# Patient Record
Sex: MALE | Race: WHITE | NOT HISPANIC OR LATINO | Employment: FULL TIME | URBAN - METROPOLITAN AREA
[De-identification: names, ages, dates, MRNs, and addresses within clinical notes are randomized per-mention and may not be internally consistent; named-entity substitution may affect disease eponyms.]

---

## 2023-08-30 PROBLEM — M25.9 MULTIPLE JOINT COMPLAINTS: Status: ACTIVE | Noted: 2023-08-30

## 2023-08-30 PROBLEM — R10.9 ABDOMINAL PAIN: Status: ACTIVE | Noted: 2023-08-30

## 2023-08-30 PROBLEM — M48.061 LUMBAR SPINAL STENOSIS: Status: ACTIVE | Noted: 2023-08-30

## 2023-08-30 PROBLEM — M25.373 INSTABILITY OF ANKLE: Status: ACTIVE | Noted: 2023-08-30

## 2023-08-30 PROBLEM — S46.812A STRAIN OF OTHER MUSCLES, FASCIA AND TENDONS AT SHOULDER AND UPPER ARM LEVEL, LEFT ARM, INITIAL ENCOUNTER: Status: ACTIVE | Noted: 2023-08-30

## 2023-08-30 PROBLEM — H43.819 PVD (POSTERIOR VITREOUS DETACHMENT): Status: ACTIVE | Noted: 2023-08-30

## 2023-08-30 PROBLEM — G56.00 CARPAL TUNNEL SYNDROME: Status: ACTIVE | Noted: 2023-08-30

## 2023-08-30 PROBLEM — S46.012A ROTATOR CUFF STRAIN, LEFT, INITIAL ENCOUNTER: Status: ACTIVE | Noted: 2023-08-30

## 2023-08-30 PROBLEM — M13.811 OTHER SPECIFIED ARTHRITIS, RIGHT SHOULDER: Status: ACTIVE | Noted: 2023-08-30

## 2023-08-30 PROBLEM — R79.89 ABNORMAL LIVER FUNCTION TEST: Status: ACTIVE | Noted: 2023-08-30

## 2023-08-30 PROBLEM — S43.92XA SPRAIN OF LEFT SHOULDER GIRDLE: Status: ACTIVE | Noted: 2023-08-30

## 2023-08-30 PROBLEM — F41.9 ANXIETY: Status: ACTIVE | Noted: 2023-08-30

## 2023-08-30 PROBLEM — S43.432A BANKART LESION OF LEFT SHOULDER: Status: ACTIVE | Noted: 2023-08-30

## 2023-08-30 PROBLEM — K21.9 ESOPHAGEAL REFLUX: Status: ACTIVE | Noted: 2023-08-30

## 2023-08-30 PROBLEM — S43.82XA: Status: ACTIVE | Noted: 2023-08-30

## 2023-08-30 PROBLEM — M19.112 POST-TRAUMATIC OSTEOARTHRITIS, LEFT SHOULDER: Status: ACTIVE | Noted: 2023-08-30

## 2023-08-30 PROBLEM — Z97.3 WEARS GLASSES: Status: ACTIVE | Noted: 2023-08-30

## 2023-08-30 PROBLEM — R20.0 FACIAL NUMBNESS: Status: ACTIVE | Noted: 2023-08-30

## 2023-08-30 PROBLEM — H25.13 AGE-RELATED NUCLEAR CATARACT OF BOTH EYES: Status: ACTIVE | Noted: 2023-08-30

## 2023-08-30 PROBLEM — E78.5 HYPERLIPIDEMIA: Status: ACTIVE | Noted: 2023-08-30

## 2023-08-30 PROBLEM — M54.50 LOW BACK PAIN: Status: ACTIVE | Noted: 2023-08-30

## 2023-08-30 PROBLEM — S43.492A BANKART LESION OF LEFT SHOULDER: Status: ACTIVE | Noted: 2023-08-30

## 2023-08-30 PROBLEM — L71.9 ROSACEA: Status: ACTIVE | Noted: 2023-08-30

## 2023-08-30 PROBLEM — M67.00 CONTRACTURE, ACHILLES TENDON: Status: ACTIVE | Noted: 2023-08-30

## 2023-08-30 PROBLEM — N20.0 NEPHROLITHIASIS: Status: ACTIVE | Noted: 2023-08-30

## 2023-08-30 PROBLEM — M54.9 DORSODYNIA: Status: ACTIVE | Noted: 2023-08-30

## 2023-08-30 PROBLEM — K63.5 BENIGN COLONIC POLYP: Status: ACTIVE | Noted: 2023-08-30

## 2023-08-30 PROBLEM — M48.02 CERVICAL STENOSIS OF SPINE: Status: ACTIVE | Noted: 2023-08-30

## 2023-08-30 PROBLEM — M43.10 SPONDYLOLISTHESIS, ACQUIRED: Status: ACTIVE | Noted: 2023-08-30

## 2023-08-30 PROBLEM — E55.9 VITAMIN D DEFICIENCY: Status: ACTIVE | Noted: 2023-08-30

## 2023-08-30 PROBLEM — M77.40 METATARSALGIA: Status: ACTIVE | Noted: 2023-08-30

## 2023-08-30 PROBLEM — R10.2 PELVIC PAIN: Status: ACTIVE | Noted: 2023-08-30

## 2023-08-30 RX ORDER — IBUPROFEN 200 MG
TABLET ORAL
COMMUNITY

## 2023-08-30 RX ORDER — POLYETHYLENE GLYCOL 3350, SODIUM SULFATE ANHYDROUS, SODIUM BICARBONATE, SODIUM CHLORIDE, POTASSIUM CHLORIDE 236; 22.74; 6.74; 5.86; 2.97 G/4L; G/4L; G/4L; G/4L; G/4L
POWDER, FOR SOLUTION ORAL
COMMUNITY
Start: 2021-11-13

## 2023-08-30 RX ORDER — IBUPROFEN 600 MG/1
600 TABLET ORAL EVERY 6 HOURS PRN
COMMUNITY
Start: 2023-06-28

## 2023-11-16 ENCOUNTER — OFFICE VISIT (OUTPATIENT)
Dept: ORTHOPEDIC SURGERY | Facility: HOSPITAL | Age: 63
End: 2023-11-16
Payer: COMMERCIAL

## 2023-11-16 DIAGNOSIS — S46.211A TRAUMATIC PARTIAL TEAR OF RIGHT BICEPS TENDON, INITIAL ENCOUNTER: Primary | ICD-10-CM

## 2023-11-16 DIAGNOSIS — S46.011A TRAUMATIC TEAR OF RIGHT ROTATOR CUFF, INITIAL ENCOUNTER: ICD-10-CM

## 2023-11-16 PROCEDURE — 99213 OFFICE O/P EST LOW 20 MIN: CPT | Performed by: ORTHOPAEDIC SURGERY

## 2023-11-16 ASSESSMENT — ENCOUNTER SYMPTOMS
FATIGUE: 0
CHILLS: 0
WHEEZING: 0
FEVER: 0
ARTHRALGIAS: 1
SHORTNESS OF BREATH: 0

## 2023-11-16 ASSESSMENT — PAIN SCALES - GENERAL: PAINLEVEL_OUTOF10: 6

## 2023-11-16 ASSESSMENT — PAIN - FUNCTIONAL ASSESSMENT: PAIN_FUNCTIONAL_ASSESSMENT: 0-10

## 2023-11-16 NOTE — PROGRESS NOTES
Reason for Appointment  Chief Complaint   Patient presents with    Right Shoulder - Pain, Follow-up     [facility]       History of Present Illness  Patient is a 62 y.o. male here today for follow-up evaluation of right shoulder pain. He is at baseline in terms of pain. He did have an episode of 3-5 days of increased pain with arm rotation and deep anterior pain. This has since calmed down nicely. As long as he does not do significant lifting, he can do activities of daily living with minimal pain. No other updates to [facility], allergies, or medications.     Past Medical History:   Diagnosis Date    Elevated prostate specific antigen (PSA)     Elevated prostate specific antigen (PSA)    Other conditions influencing health status 12/11/2013    Meatal stenosis    Personal history of other diseases of the respiratory system 01/17/2014    History of acute bronchitis    Spondylosis without myelopathy or radiculopathy, lumbar region 12/11/2013    Lumbar spondylosis    Unspecified asthma, uncomplicated 01/17/2014    Asthmatic bronchitis    Unspecified osteoarthritis, unspecified site 12/13/2013    Osteoarthritis       Past Surgical History:   Procedure Laterality Date    CARPAL TUNNEL RELEASE  03/15/2018    Neuroplasty Decompression Median Nerve At Carpal Tunnel    COLONOSCOPY  07/01/2013    Complete Colonoscopy    KNEE ARTHROSCOPY W/ DEBRIDEMENT  01/17/2014    Knee Arthroscopy (Therapeutic)    ROTATOR CUFF REPAIR  01/17/2014    Rotator Cuff Repair    US GUIDED PERCUTANEOUS PLACEMENT  12/7/2022    [facility]       Medication Documentation Review Audit    **Prior to Admission medications have not yet been reviewed**         Allergies   Allergen Reactions    Amoxicillin-Pot Clavulanate Nausea Only       Review of Systems   Constitutional:  Negative for chills, fatigue and fever.   Respiratory:  Negative for shortness of breath and wheezing.    Cardiovascular:  Negative for chest pain and leg  swelling.   Musculoskeletal:  Positive for arthralgias.   All other systems reviewed and are negative.      Exam   On exam of the right arm, there is 120 degrees elevation, no crepitation, no joint effusion, good deltoid function, good pulses, good sensation     Assessment   Encounter Diagnoses   Name Primary?    Traumatic partial tear of right biceps tendon, initial encounter Yes    Traumatic tear of right rotator cuff, initial encounter        Plan   We will follow-up in 4 months with right shoulder X-rays to gauge the deterioration of the arthritis. He will call with any severe increased pain. Follow-up in 4 months.     I, Concepcion Goddard, attest that this documentation has been prepared under the direction and in the presence of Lm Briseno MD. By signing below, I, Lm Briseno MD, personally performed the services described in this documentation. All medical record entries made by the scribe were at my direction and in my presence. I have reviewed the chart and agree that the record reflects my personal performance and is accurate and complete. 11/16/23

## 2024-02-01 ENCOUNTER — OFFICE VISIT (OUTPATIENT)
Dept: ORTHOPEDIC SURGERY | Facility: HOSPITAL | Age: 64
End: 2024-02-01
Payer: COMMERCIAL

## 2024-02-01 ENCOUNTER — HOSPITAL ENCOUNTER (OUTPATIENT)
Dept: RADIOLOGY | Facility: HOSPITAL | Age: 64
Discharge: HOME | End: 2024-02-01
Payer: COMMERCIAL

## 2024-02-01 DIAGNOSIS — S46.211A TRAUMATIC PARTIAL TEAR OF RIGHT BICEPS TENDON, INITIAL ENCOUNTER: ICD-10-CM

## 2024-02-01 DIAGNOSIS — S46.011A STRAIN OF TENDON OF RIGHT ROTATOR CUFF, INITIAL ENCOUNTER: ICD-10-CM

## 2024-02-01 PROCEDURE — 99213 OFFICE O/P EST LOW 20 MIN: CPT | Performed by: ORTHOPAEDIC SURGERY

## 2024-02-01 PROCEDURE — 73030 X-RAY EXAM OF SHOULDER: CPT | Mod: RT

## 2024-02-01 ASSESSMENT — PAIN SCALES - GENERAL: PAINLEVEL_OUTOF10: 5 - MODERATE PAIN

## 2024-02-01 ASSESSMENT — ENCOUNTER SYMPTOMS
TROUBLE SWALLOWING: 0
SHORTNESS OF BREATH: 0
FATIGUE: 0
FEVER: 0
CHILLS: 0
WHEEZING: 0

## 2024-02-01 ASSESSMENT — PAIN - FUNCTIONAL ASSESSMENT: PAIN_FUNCTIONAL_ASSESSMENT: 0-10

## 2024-02-01 NOTE — PROGRESS NOTES
Reason for Appointment  Baseline R shoulder pain    History of Present Illness  Patient is a 63 y.o. male here today for a [facility] case follow-up evaluation of continued baseline right shoulder pain.  X-rays taken today are reviewed and do show significant degenerative change of the glenohumeral joint with intact rotator cuff anchors and a slightly high riding humeral head.  He continues to modify activity in order to avoid severely aggravating the shoulder.  He has difficulties doing any overhead activity and lifting.  No recent injuries or falls.  No other changes in his past medical history, allergies, or medications.      Past Medical History:   Diagnosis Date    Elevated prostate specific antigen (PSA)     Elevated prostate specific antigen (PSA)    Other conditions influencing health status 12/11/2013    Meatal stenosis    Personal history of other diseases of the respiratory system 01/17/2014    History of acute bronchitis    Spondylosis without myelopathy or radiculopathy, lumbar region 12/11/2013    Lumbar spondylosis    Unspecified asthma, uncomplicated 01/17/2014    Asthmatic bronchitis    Unspecified osteoarthritis, unspecified site 12/13/2013    Osteoarthritis       Past Surgical History:   Procedure Laterality Date    CARPAL TUNNEL RELEASE  03/15/2018    Neuroplasty Decompression Median Nerve At Carpal Tunnel    COLONOSCOPY  07/01/2013    Complete Colonoscopy    KNEE ARTHROSCOPY W/ DEBRIDEMENT  01/17/2014    Knee Arthroscopy (Therapeutic)    ROTATOR CUFF REPAIR  01/17/2014    Rotator Cuff Repair    US GUIDED PERCUTANEOUS PLACEMENT  12/7/2022    US GUIDED PERCUTANEOUS PLACEMENT [facility] CLINICAL LEGACY       Medication Documentation Review Audit       Reviewed by Ashtyn Alejandre MA (Medical Assistant) on 02/01/24 at 0753      Medication Order Taking? Sig Documenting Provider Last Dose Status   ibuprofen (AdviL) 200 mg tablet [id number] Yes  Historical Provider, MD Taking Active   ibuprofen 600 mg tablet  [id number] Yes Take 1 tablet (600 mg) by mouth every 6 hours if needed (pain). Historical Provider, MD Taking Active   loratadine (CLARITIN ORAL) [id number] Yes Take by mouth. Historical Provider, MD Taking Active   polyethylene glycol-electrolytes (polyethylene glycol) 420 gram solution [id number] Yes Drink two liters starting at 5 PM and additional two liters starting at midnight as directed. Historical Provider, MD Taking Active                    Allergies   Allergen Reactions    Amoxicillin-Pot Clavulanate Nausea Only       Review of Systems   Constitutional:  Negative for chills, fatigue and fever.   HENT:  Negative for postnasal drip and trouble swallowing.    Respiratory:  Negative for shortness of breath and wheezing.    Cardiovascular:  Negative for chest pain and leg swelling.   Skin:  Negative for pallor and rash.     Exam   On exam the right shoulder shows 120 degrees of active forward flexion.  He has mild weakness with resisted external rotation but fairly good cuff strength.  Deltoid is functional.  Some crepitation movement of the shoulder.  Good pulses and sensation in the upper extremity.    Assessment   Encounter Diagnoses   Name Primary?    Traumatic partial tear of right biceps tendon, initial encounter     Strain of tendon of right rotator cuff, initial encounter        Plan   He continues to function with baseline pain in the right shoulder as long as he avoids significantly aggravating activity especially overhead.  We again have discussed the possible reverse shoulder replacement in the future if his symptoms pain increase.  He can follow-up with us in 4 months months he has any new issues.    Written by Lorena Gonzalez PA-C

## 2024-02-15 ENCOUNTER — APPOINTMENT (OUTPATIENT)
Dept: ORTHOPEDIC SURGERY | Facility: HOSPITAL | Age: 64
End: 2024-02-15
Payer: COMMERCIAL

## 2024-06-06 ENCOUNTER — OFFICE VISIT (OUTPATIENT)
Dept: ORTHOPEDIC SURGERY | Facility: HOSPITAL | Age: 64
End: 2024-06-06
Payer: COMMERCIAL

## 2024-06-06 DIAGNOSIS — S46.011A TRAUMATIC TEAR OF RIGHT ROTATOR CUFF, INITIAL ENCOUNTER: Primary | ICD-10-CM

## 2024-06-06 PROCEDURE — 99213 OFFICE O/P EST LOW 20 MIN: CPT | Performed by: ORTHOPAEDIC SURGERY

## 2024-06-06 ASSESSMENT — ENCOUNTER SYMPTOMS
WHEEZING: 0
FEVER: 0
RHINORRHEA: 0
TROUBLE SWALLOWING: 0
CHILLS: 0
ARTHRALGIAS: 1
FATIGUE: 0
SHORTNESS OF BREATH: 0

## 2024-06-06 ASSESSMENT — PAIN - FUNCTIONAL ASSESSMENT: PAIN_FUNCTIONAL_ASSESSMENT: 0-10

## 2024-06-06 ASSESSMENT — PAIN SCALES - GENERAL: PAINLEVEL_OUTOF10: 4

## 2024-06-06 NOTE — PROGRESS NOTES
Reason for Appointment  Chief Complaint   Patient presents with    Right Shoulder - Follow-up     History of Present Illness  Patient is a 63 y.o. male here today for a [facility] case follow-up evaluation of baseline right shoulder pain.  He continues to have pain and weakness with any overhead motion.  He is very careful with what he does and avoids aggravating the shoulders.  We again have discussed a reverse shoulder replacement in the future but he would like to avoid this for as long as possible.  No other is in his past medical history, allergies, or medications.    Past Medical History:   Diagnosis Date    Elevated prostate specific antigen (PSA)     Elevated prostate specific antigen (PSA)    Other conditions influencing health status 12/11/2013    Meatal stenosis    Personal history of other diseases of the respiratory system 01/17/2014    History of acute bronchitis    Spondylosis without myelopathy or radiculopathy, lumbar region 12/11/2013    Lumbar spondylosis    Unspecified asthma, uncomplicated ([facility]) 01/17/2014    Asthmatic bronchitis    Unspecified osteoarthritis, unspecified site 12/13/2013    Osteoarthritis       Past Surgical History:   Procedure Laterality Date    CARPAL TUNNEL RELEASE  03/15/2018    Neuroplasty Decompression Median Nerve At Carpal Tunnel    COLONOSCOPY  07/01/2013    Complete Colonoscopy    KNEE ARTHROSCOPY W/ DEBRIDEMENT  01/17/2014    Knee Arthroscopy (Therapeutic)    ROTATOR CUFF REPAIR  01/17/2014    Rotator Cuff Repair    US GUIDED PERCUTANEOUS PLACEMENT  12/7/2022    US GUIDED PERCUTANEOUS PLACEMENT [facility] CLINICAL LEGACY       Medication Documentation Review Audit       Reviewed by Ashtyn Alejandre MA (Medical Assistant) on 06/06/24 at 0756      Medication Order Taking? Sig Documenting Provider Last Dose Status   ibuprofen (AdviL) 200 mg tablet [id number] Yes  Historical Provider, MD Taking Active   ibuprofen 600 mg tablet [id number] Yes Take 1 tablet (600 mg) by mouth every 6  hours if needed (pain). Historical Provider, MD Taking Active   loratadine (CLARITIN ORAL) [id number] Yes Take by mouth. Historical Provider, MD Taking Active   polyethylene glycol-electrolytes (polyethylene glycol) 420 gram solution [id number] Yes Drink two liters starting at 5 PM and additional two liters starting at midnight as directed. Historical Provider, MD Taking Active                    Allergies   Allergen Reactions    Amoxicillin-Pot Clavulanate Nausea Only       Review of Systems   Constitutional:  Negative for chills, fatigue and fever.   HENT:  Negative for rhinorrhea and trouble swallowing.    Respiratory:  Negative for shortness of breath and wheezing.    Cardiovascular:  Negative for chest pain and leg swelling.   Musculoskeletal:  Positive for arthralgias.   Skin:  Negative for pallor and rash.     Exam   On exam the right shoulder shows baseline motion up to about 120 degrees with some crepitation.  He has mild weakness with resisted external rotation.  Deltoid is functional.  Positive impingement signs on the right.  Good pulses and sensation in the upper extremity.    Assessment   Right rotator cuff tear    Plan   Again, he understands long-term a possible reverse shoulder replacement but he would like to again avoid this for as long as possible.  He  uses proper lifting techniques and avoiding aggravating activity.  He can follow-up with us in 4 months unless he has any new issues.

## 2024-10-10 ENCOUNTER — APPOINTMENT (OUTPATIENT)
Dept: ORTHOPEDIC SURGERY | Facility: HOSPITAL | Age: 64
End: 2024-10-10
Payer: COMMERCIAL

## 2024-10-17 ENCOUNTER — OFFICE VISIT (OUTPATIENT)
Dept: ORTHOPEDIC SURGERY | Facility: HOSPITAL | Age: 64
End: 2024-10-17
Payer: COMMERCIAL

## 2024-10-17 DIAGNOSIS — S46.011A TRAUMATIC TEAR OF RIGHT ROTATOR CUFF, INITIAL ENCOUNTER: Primary | ICD-10-CM

## 2024-10-17 PROCEDURE — 99213 OFFICE O/P EST LOW 20 MIN: CPT | Performed by: ORTHOPAEDIC SURGERY

## 2024-10-17 ASSESSMENT — PAIN - FUNCTIONAL ASSESSMENT: PAIN_FUNCTIONAL_ASSESSMENT: 0-10

## 2024-10-17 ASSESSMENT — ENCOUNTER SYMPTOMS
CHILLS: 0
SHORTNESS OF BREATH: 0
WHEEZING: 0
FEVER: 0
FATIGUE: 0

## 2024-10-17 ASSESSMENT — PAIN SCALES - GENERAL: PAINLEVEL_OUTOF10: 6

## 2024-10-17 NOTE — PROGRESS NOTES
Reason for Appointment  Chief Complaint   Patient presents with    Right Shoulder - Follow-up     History of Present Illness  Patient is a 63 y.o. 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     Past Medical History:   Diagnosis Date    Elevated prostate specific antigen (PSA)     Elevated prostate specific antigen (PSA)    Other conditions influencing health status 12/11/2013    Meatal stenosis    Personal history of other diseases of the respiratory system 01/17/2014    History of acute bronchitis    Spondylosis without myelopathy or radiculopathy, lumbar region 12/11/2013    Lumbar spondylosis    Unspecified asthma, uncomplicated ([facility]) 01/17/2014    Asthmatic bronchitis    Unspecified osteoarthritis, unspecified site 12/13/2013    Osteoarthritis       Past Surgical History:   Procedure Laterality Date    CARPAL TUNNEL RELEASE  03/15/2018    Neuroplasty Decompression Median Nerve At Carpal Tunnel    COLONOSCOPY  07/01/2013    Complete Colonoscopy    KNEE ARTHROSCOPY W/ DEBRIDEMENT  01/17/2014    Knee Arthroscopy (Therapeutic)    ROTATOR CUFF REPAIR  01/17/2014    Rotator Cuff Repair    US GUIDED PERCUTANEOUS PLACEMENT  12/7/2022    US GUIDED PERCUTANEOUS PLACEMENT [facility]       Medication Documentation Review Audit       Reviewed by Lm Briseno MD (Physician)  on 10/17/24 at 0818      Medication Order Taking? Sig Documenting Provider Last Dose Status   ibuprofen (AdviL) 200 mg tablet [id number] Yes  Historical Provider, MD Taking Active   ibuprofen 600 mg tablet [id number] Yes Take 1 tablet (600 mg) by mouth every 6 hours if needed (pain). Historical Provider, MD Taking Active   loratadine (CLARITIN ORAL) [id number] Yes Take by mouth. Historical Provider, MD Taking Active   polyethylene glycol-electrolytes (polyethylene glycol) 420 gram solution [id number] Yes Drink two liters starting at 5 PM and additional two liters starting at midnight as directed. Historical Provider, MD Taking Active                    Allergies   Allergen Reactions    Amoxicillin-Pot Clavulanate Nausea Only       Review of Systems   Constitutional:  Negative for chills, fatigue and fever.   Respiratory:  Negative for shortness of breath and wheezing.    Cardiovascular:  Negative for chest pain and leg swelling.       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

## 2025-02-13 ENCOUNTER — APPOINTMENT (OUTPATIENT)
Dept: ORTHOPEDIC SURGERY | Facility: HOSPITAL | Age: 65
End: 2025-02-13
Payer: COMMERCIAL

## 2025-02-13 DIAGNOSIS — S46.011A TRAUMATIC TEAR OF RIGHT ROTATOR CUFF, INITIAL ENCOUNTER: Primary | ICD-10-CM

## 2025-02-13 PROCEDURE — 99213 OFFICE O/P EST LOW 20 MIN: CPT | Performed by: ORTHOPAEDIC SURGERY

## 2025-02-13 ASSESSMENT — ENCOUNTER SYMPTOMS
NUMBNESS: 0
ARTHRALGIAS: 1
BRUISES/BLEEDS EASILY: 0
WHEEZING: 0
FATIGUE: 0
SHORTNESS OF BREATH: 0
FEVER: 0
CHILLS: 0

## 2025-02-13 ASSESSMENT — PAIN SCALES - GENERAL: PAINLEVEL_OUTOF10: 3

## 2025-02-13 ASSESSMENT — PAIN - FUNCTIONAL ASSESSMENT: PAIN_FUNCTIONAL_ASSESSMENT: 0-10

## 2025-02-13 NOTE — PROGRESS NOTES
Reason for Appointment  Chief Complaint   Patient presents with    Right Shoulder - Follow-up     History of Present Illness  Patient is a 64 y.o. male here today for follow-up evaluation of right shoulder baseline pain. We last saw the patient on 10/17/24 for baseline right shoulder pain and we discussed x-rays of his right shoulder to keep an eye on how much arthritic changes occurring. Today he states he continues at baseline. He does have flare ups with activities sustained overhead. He has no numbness or tingling. No recent falls or injuries. No other changes in past medical history, allergies, or medications.      Past Medical History:   Diagnosis Date    Elevated prostate specific antigen (PSA)     Elevated prostate specific antigen (PSA)    Other conditions influencing health status 12/11/2013    Meatal stenosis    Personal history of other diseases of the respiratory system 01/17/2014    History of acute bronchitis    Spondylosis without myelopathy or radiculopathy, lumbar region 12/11/2013    Lumbar spondylosis    Unspecified asthma, uncomplicated ([facility]) 01/17/2014    Asthmatic bronchitis    Unspecified osteoarthritis, unspecified site 12/13/2013    Osteoarthritis       Past Surgical History:   Procedure Laterality Date    CARPAL TUNNEL RELEASE  03/15/2018    Neuroplasty Decompression Median Nerve At Carpal Tunnel    COLONOSCOPY  07/01/2013    Complete Colonoscopy    KNEE ARTHROSCOPY W/ DEBRIDEMENT  01/17/2014    Knee Arthroscopy (Therapeutic)    ROTATOR CUFF REPAIR  01/17/2014    Rotator Cuff Repair    US GUIDED PERCUTANEOUS PLACEMENT  12/7/2022    US GUIDED PERCUTANEOUS PLACEMENT [facility] CLINICAL LEGACY       Medication Documentation Review Audit       Reviewed by Ashtyn Robins MA (Medical Assistant) on 02/13/25 at 0801      Medication Order Taking? Sig Documenting Provider Last Dose Status   ibuprofen (AdviL) 200 mg tablet [id number] Yes  Historical Provider, MD Taking Active   ibuprofen 600 mg tablet  [id number] Yes Take 1 tablet (600 mg) by mouth every 6 hours if needed (pain). Historical Provider, MD Taking Active   loratadine (CLARITIN ORAL) [id number] Yes Take by mouth. Historical Provider, MD Taking Active   polyethylene glycol-electrolytes (polyethylene glycol) 420 gram solution [id number] Yes Drink two liters starting at 5 PM and additional two liters starting at midnight as directed. Historical Provider, MD Taking Active                    Allergies   Allergen Reactions    Amoxicillin-Pot Clavulanate Nausea Only       Review of Systems   Constitutional:  Negative for chills, fatigue and fever.   Respiratory:  Negative for shortness of breath and wheezing.    Cardiovascular:  Negative for chest pain and leg swelling.   Musculoskeletal:  Positive for arthralgias.   Allergic/Immunologic: Negative for immunocompromised state.   Neurological:  Negative for numbness.   Hematological:  Does not bruise/bleed easily.       Exam   Gets up to 120 degrees. deltoid functional. No effusion. Definite weakness in extremal rotation with mild crepitation. Mild arthritic changes in the hand good pulses and sensation.  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 He can follow up in 4-6 months.       I, Flori Barnett, attest that this documentation has been prepared under the direction and in the presence of Lm Briseno MD.   By signing below, ILm MD, personally performed the services described in this documentation. All medical record entries made by the scribe were at my direction and in my presence. I have reviewed the chart and agree that the record reflects my personal performance and is accurate and complete.

## 2025-05-01 ENCOUNTER — HOSPITAL ENCOUNTER (OUTPATIENT)
Dept: RADIOLOGY | Facility: HOSPITAL | Age: 65
Discharge: HOME | End: 2025-05-01
Payer: COMMERCIAL

## 2025-05-01 ENCOUNTER — OFFICE VISIT (OUTPATIENT)
Dept: ORTHOPEDIC SURGERY | Facility: HOSPITAL | Age: 65
End: 2025-05-01
Payer: COMMERCIAL

## 2025-05-01 DIAGNOSIS — M13.811: ICD-10-CM

## 2025-05-01 DIAGNOSIS — M25.551 RIGHT HIP PAIN: ICD-10-CM

## 2025-05-01 DIAGNOSIS — S46.011A TRAUMATIC TEAR OF RIGHT ROTATOR CUFF, INITIAL ENCOUNTER: Primary | ICD-10-CM

## 2025-05-01 PROCEDURE — 73502 X-RAY EXAM HIP UNI 2-3 VIEWS: CPT | Mod: RT

## 2025-05-01 PROCEDURE — 73030 X-RAY EXAM OF SHOULDER: CPT | Mod: RT

## 2025-05-01 PROCEDURE — 99213 OFFICE O/P EST LOW 20 MIN: CPT | Performed by: ORTHOPAEDIC SURGERY

## 2025-05-01 PROCEDURE — 99212 OFFICE O/P EST SF 10 MIN: CPT | Performed by: ORTHOPAEDIC SURGERY

## 2025-05-01 PROCEDURE — 1036F TOBACCO NON-USER: CPT | Performed by: ORTHOPAEDIC SURGERY

## 2025-05-01 ASSESSMENT — ENCOUNTER SYMPTOMS
JOINT SWELLING: 0
BRUISES/BLEEDS EASILY: 0
NUMBNESS: 0
FATIGUE: 0
SHORTNESS OF BREATH: 0
RHINORRHEA: 0
CHILLS: 0
WHEEZING: 0
FEVER: 0
SORE THROAT: 0
WOUND: 0
ARTHRALGIAS: 1

## 2025-05-01 ASSESSMENT — PAIN SCALES - GENERAL
PAINLEVEL_OUTOF10: 4
PAINLEVEL_OUTOF10: 3

## 2025-05-01 ASSESSMENT — PAIN - FUNCTIONAL ASSESSMENT: PAIN_FUNCTIONAL_ASSESSMENT: 0-10

## 2025-05-01 NOTE — PROGRESS NOTES
Reason for Appointment  Chief Complaint   Patient presents with    Right Shoulder - Follow-up     History of Present Illness  Patient is a 64 y.o. male here today for a [facility] case follow-up evaluation of his right shoulder. He is still having baseline shoulder pain and stiffness. Updated x-rays taken today show a high riding humeral head with degenerative change and anchors in the humeral head. Again, we have discussed a possible reverse shoulder replacement in the future but he is not ready for this yet. He is careful with activity, still gets flare ups of pain when he does too much. No other changes in his PMH, allergies, or medications    Medical History[1]    Surgical History[2]    Medication Documentation Review Audit       Reviewed by Lm Briseno MD (Physician) on 02/13/25 at 1658      Medication Order Taking? Sig Documenting Provider Last Dose Status   ibuprofen (AdviL) 200 mg tablet [id number] Yes  Historical Provider, MD Taking Active   ibuprofen 600 mg tablet [id number] Yes Take 1 tablet (600 mg) by mouth every 6 hours if needed (pain). Historical Provider, MD Taking Active   loratadine (CLARITIN ORAL) [id number] Yes Take by mouth. Historical Provider, MD Taking Active   polyethylene glycol-electrolytes (polyethylene glycol) 420 gram solution [id number] Yes Drink two liters starting at 5 PM and additional two liters starting at midnight as directed. Historical Provider, MD Taking Active                    RX Allergies[3]    Review of Systems   Constitutional:  Negative for chills, fatigue and fever.   HENT:  Negative for mouth sores, rhinorrhea and sore throat.    Respiratory:  Negative for shortness of breath and wheezing.    Cardiovascular:  Negative for chest pain and leg swelling.   Musculoskeletal:  Positive for arthralgias. Negative for joint swelling.   Skin:  Negative for rash and wound.     Exam   On exam he has about 100 degrees of active forward flexion, stiff with rotation. Deltoid is  functional.  Assessment   Encounter Diagnoses   Name Primary?    Allergic arthritis involving shoulder region, right Yes    Right hip pain        Plan                          [1]   Past Medical History:  Diagnosis Date    Elevated prostate specific antigen (PSA)     Elevated prostate specific antigen (PSA)    Other conditions influencing health status 12/11/2013    Meatal stenosis    Personal history of other diseases of the respiratory system 01/17/2014    History of acute bronchitis    Spondylosis without myelopathy or radiculopathy, lumbar region 12/11/2013    Lumbar spondylosis    Unspecified asthma, uncomplicated ([facility]) 01/17/2014    Asthmatic bronchitis    Unspecified osteoarthritis, unspecified site 12/13/2013    Osteoarthritis   [2]   Past Surgical History:  Procedure Laterality Date    CARPAL TUNNEL RELEASE  03/15/2018    Neuroplasty Decompression Median Nerve At Carpal Tunnel    COLONOSCOPY  07/01/2013    Complete Colonoscopy    KNEE ARTHROSCOPY W/ DEBRIDEMENT  01/17/2014    Knee Arthroscopy (Therapeutic)    ROTATOR CUFF REPAIR  01/17/2014    Rotator Cuff Repair    US GUIDED PERCUTANEOUS PLACEMENT  12/7/2022    US GUIDED PERCUTANEOUS PLACEMENT [facility]   [3]   Allergies  Allergen Reactions    Amoxicillin-Pot Clavulanate Nausea Only

## 2025-05-01 NOTE — PROGRESS NOTES
Reason for Appointment  Chief Complaint   Patient presents with    Right Shoulder - Follow-up     History of Present Illness  Patient is a 64 y.o. male here today for follow-up evaluation of right shoulder pain, he has been doing well at baseline but x-rays today which we have not had for a while show severe rotator cuff arthritis and he functions much better than his x-ray.  As long as he keeps motion in front of him he does well.  No radicular symptoms down the arm no falls or injuries.  Not take anything for pain at present occasionally takes some ibuprofen no changes in his history.     Medical History[1]    Surgical History[2]    Medication Documentation Review Audit       Reviewed by Lm Briseno MD (Physician) on 05/01/25 at 0831      Medication Order Taking? Sig Documenting Provider Last Dose Status   ibuprofen (AdviL) 200 mg tablet [id number] No  Historical Provider, MD Taking Active   ibuprofen 600 mg tablet [id number] No Take 1 tablet (600 mg) by mouth every 6 hours if needed (pain). Historical Provider, MD Taking Active   loratadine (CLARITIN ORAL) [id number] No Take by mouth. Historical Provider, MD Taking Active   polyethylene glycol-electrolytes (polyethylene glycol) 420 gram solution [id number] No Drink two liters starting at 5 PM and additional two liters starting at midnight as directed. Historical Provider, MD Taking Active                    RX Allergies[3]    Review of Systems   Constitutional:  Negative for chills, fatigue and fever.   Respiratory:  Negative for shortness of breath and wheezing.    Cardiovascular:  Negative for chest pain and leg swelling.   Skin: Negative.    Allergic/Immunologic: Negative for immunocompromised state.   Neurological:  Negative for numbness.   Hematological:  Does not bruise/bleed easily.       Exam   Exam shows difficulty getting his arm over 110 degrees but he does have decent strength with his arm at his side but part of that is due to the contracture.  Good  deltoid function good pulses good sensation in that hand  Assessment   Encounter Diagnoses   Name Primary?    Allergic arthritis involving shoulder region, right Yes    Right hip pain        Plan     We will follow-up on a 6-month basis the mainstay of treatment is precautions with range of motion occasional injection long-term reverse shoulder replacement most likely will be necessary                     [1]   Past Medical History:  Diagnosis Date    Elevated prostate specific antigen (PSA)     Elevated prostate specific antigen (PSA)    Other conditions influencing health status 12/11/2013    Meatal stenosis    Personal history of other diseases of the respiratory system 01/17/2014    History of acute bronchitis    Spondylosis without myelopathy or radiculopathy, lumbar region 12/11/2013    Lumbar spondylosis    Unspecified asthma, uncomplicated ([facility]) 01/17/2014    Asthmatic bronchitis    Unspecified osteoarthritis, unspecified site 12/13/2013    Osteoarthritis   [2]   Past Surgical History:  Procedure Laterality Date    CARPAL TUNNEL RELEASE  03/15/2018    Neuroplasty Decompression Median Nerve At Carpal Tunnel    COLONOSCOPY  07/01/2013    Complete Colonoscopy    KNEE ARTHROSCOPY W/ DEBRIDEMENT  01/17/2014    Knee Arthroscopy (Therapeutic)    ROTATOR CUFF REPAIR  01/17/2014    Rotator Cuff Repair    US GUIDED PERCUTANEOUS PLACEMENT  12/7/2022    US GUIDED PERCUTANEOUS PLACEMENT [facility]   [3]   Allergies  Allergen Reactions    Amoxicillin-Pot Clavulanate Nausea Only

## 2025-05-22 ENCOUNTER — OFFICE VISIT (OUTPATIENT)
Dept: ORTHOPEDIC SURGERY | Facility: HOSPITAL | Age: 65
End: 2025-05-22
Payer: COMMERCIAL

## 2025-05-22 VITALS — WEIGHT: 218 LBS | HEIGHT: 69 IN | BODY MASS INDEX: 32.29 KG/M2

## 2025-05-22 DIAGNOSIS — M16.11 PRIMARY OSTEOARTHRITIS OF RIGHT HIP: Primary | ICD-10-CM

## 2025-05-22 PROCEDURE — 1036F TOBACCO NON-USER: CPT | Performed by: ORTHOPAEDIC SURGERY

## 2025-05-22 PROCEDURE — 99212 OFFICE O/P EST SF 10 MIN: CPT | Performed by: ORTHOPAEDIC SURGERY

## 2025-05-22 PROCEDURE — 99204 OFFICE O/P NEW MOD 45 MIN: CPT | Performed by: ORTHOPAEDIC SURGERY

## 2025-05-22 PROCEDURE — 3008F BODY MASS INDEX DOCD: CPT | Performed by: ORTHOPAEDIC SURGERY

## 2025-05-22 RX ORDER — DICLOFENAC SODIUM 75 MG/1
75 TABLET, DELAYED RELEASE ORAL 2 TIMES DAILY PRN
Qty: 30 TABLET | Refills: 0 | Status: SHIPPED | OUTPATIENT
Start: 2025-05-22

## 2025-05-22 ASSESSMENT — PAIN - FUNCTIONAL ASSESSMENT: PAIN_FUNCTIONAL_ASSESSMENT: 0-10

## 2025-05-22 ASSESSMENT — PAIN SCALES - GENERAL: PAINLEVEL_OUTOF10: 8

## 2025-05-22 NOTE — LETTER
May 22, 2025     Patient: Rubén Lo   YOB: 1960   Date of Visit: 5/22/2025       To Whom It May Concern:    Rubén Lo was seen in my clinic on 5/22/2025 at 1:45 pm. Please excuse Yuli for her absence from work on this day to make the appointment.    If you have any questions or concerns, please don't hesitate to call.         Sincerely,         Manuel Khoury,         CC: No Recipients

## 2025-05-22 NOTE — PROGRESS NOTES
PRIMARY CARE PHYSICIAN: Jay Paige MD  REFERRING PROVIDER: No referring provider defined for this encounter.     CONSULT ORTHOPAEDIC: Hip Evaluation        ASSESSMENT & PLAN    IMPRESSION:  Primary osteoarthritis, right hip    PLAN:  Discussed with patient his wife's diagnosis above.  Reviewed his current x-rays.  He does have severe arthritis of right hip that is affecting his quality of life.  Patient is an active lifestyle and significant commitments throughout the spring and summer and is reluctant on proceeding with surgical intervention at this time.  He would like to continue conservative management with oral anti-inflammatories and think about his treatment options while he wants to proceed sooner versus later in the fall or winter.  Will hold off on an injection as he is understanding that if he does get an injection would have to wait 3 months prior to proceeding with surgical intervention we will continue to try activity modification, home exercises, anti-inflammatory medication.      SUBJECTIVE  CHIEF COMPLAINT:   Chief Complaint   Patient presents with    Right Hip - Pain        HPI: Rubén Lo is a 64 y.o. patient. Rubén Lo has had progressive problems with the right hip over the past 3 months. Overall been bad for 6+ months. They do not report any history of trauma to the area(s). They do report constant numbness and/or tingling in their right foot. Their symptoms that are interfering with their daily life include lateral sided hip and groin pain. Worse with with hip flexion. XR done 5/1/2025. Sees Dr Arreguin for his back and may need a low back fusion at some point.    FUNCTIONAL STATUS: occasionally limited.  AMBULATORY STATUS: Independent  PREVIOUS TREATMENTS: NSAIDS Advil with mild improvement, still taking  Analgesics Tylenol still taking with mild improvement  HISTORY OF SURGERY ON AFFECTED HIP(S): No   BACK PAIN REPORTED: Yes,, Hx of a slipped discs      REVIEW OF  "SYSTEMS  Constitutional: See HPI for pain assessment, No significant weight loss, recent trauma  Cardiovascular: No chest pain, shortness of breath  Respiratory: No difficulty breathing, cough  Gastrointestinal: No nausea, vomiting, diarrhea, constipation  Musculoskeletal: Noted in HPI, positive for pain, restricted motion, stiffness  Integumentary: No rashes, easy bruising, redness   Neurological: no numbness or tingling in extremities, no gait disturbances   Psychiatric: No mood changes, memory changes, social issues  Heme/Lymph: no excessive swelling, easy bruising, excessive bleeding  ENT: No hearing changes  Eyes: No vision changes    Medical History[1]     Allergies[2]     Surgical History[3]     Family History[4]     Social History     Socioeconomic History    Marital status:      Spouse name: Not on file    Number of children: Not on file    Years of education: Not on file    Highest education level: Not on file   Occupational History    Not on file   Tobacco Use    Smoking status: Never    Smokeless tobacco: Never   Substance and Sexual Activity    Alcohol use: Not Currently    Drug use: Defer    Sexual activity: Defer   Other Topics Concern    Not on file   Social History Narrative    Not on file     Social Drivers of Health     Financial Resource Strain: Not on file   Food Insecurity: Not on file   Transportation Needs: Not on file   Physical Activity: Not on file   Stress: Not on file   Social Connections: Not on file   Intimate Partner Violence: Not on file   Housing Stability: Not on file        CURRENT MEDICATIONS:   Current Medications[5]     OBJECTIVE    PHYSICAL EXAM      6/17/2021     1:33 PM 6/17/2021     1:55 PM 7/6/2022    12:00 AM 12/22/2022     2:48 PM 1/25/2023    12:00 AM 5/22/2025     1:58 PM   Vitals   Systolic 130 124 108 122 122    Diastolic 90 82 70 76 70    Heart Rate 85  76 80     Temp 36.1 °C (97 °F)   36.9 °C (98.4 °F)     Resp 16  20  16    Height 1.803 m (5' 11\")   1.803 m " "(5' 11\") 1.803 m (5' 11\") 1.765 m (5' 9.49\")   Weight (lb) 216   210 212 218   BMI 30.13 kg/m2   29.29 kg/m2 29.57 kg/m2 31.74 kg/m2   BSA (m2) 2.22 m2   2.18 m2 2.2 m2 2.2 m2   Visit Report      Report      Body mass index is 31.74 kg/m².    GENERAL: A/Ox3, NAD. Appears healthy, well nourished  PSYCHIATRIC: Mood stable, appropriate memory recall  EYES: EOM intact, no scleral icterus  CARDIAC: regular rate  LUNGS: Breathing non-labored  SKIN: no erythema, rashes, or ecchymoses     MUSCULOSKELETAL:  Laterality: right Hip Exam  - ROM, Extension: full, no flexion contracture  - Strength: Abduction 5/5 against resitance, Flexion 5/5  - Palpation: mild TTP along greater trochanter  - Log roll/IR exam: painful and limited motion. Pain with hip flexion past 90 degrees and internal rotation  - Straight leg raise: negative  - EHL/PF/DF motor intact  - Gait: antalgic to arthritic side, negative Trendelenburg gait   - Special Tests: none performed    NEUROVASCULAR:  - Neurovascular Status: sensation intact to light touch distally  - Capillary refill brisk at extremities, Bilateral dorsalis pedis pulse 2+       IMAGING:  Multiple views of the affected right hip(s) demonstrate: severe osteoarthritis of the right hip with complete joint space narrowing, subchondral sclerosis, marginal severe change and underlying cam deformity.   X-rays were personally reviewed and interpreted by me.  Radiology reports were reviewed by me as well, if readily available at the time.    ** Voice Recognition software was used to dictate this note. Please be aware that minor errors in the transcription may be present **    Manuel Khoury DO  Attending Surgeon  Joint Replacement and Adult Reconstructive Surgery  [place], [place]         [1]   Past Medical History:  Diagnosis Date    Elevated prostate specific antigen (PSA)     Elevated prostate specific antigen (PSA)    Other conditions influencing health status 12/11/2013    " Meatal stenosis    Personal history of other diseases of the respiratory system 01/17/2014    History of acute bronchitis    Spondylosis without myelopathy or radiculopathy, lumbar region 12/11/2013    Lumbar spondylosis    Unspecified asthma, uncomplicated ([facility]) 01/17/2014    Asthmatic bronchitis    Unspecified osteoarthritis, unspecified site 12/13/2013    Osteoarthritis   [2]   Allergies  Allergen Reactions    Amoxicillin-Pot Clavulanate Nausea Only   [3]   Past Surgical History:  Procedure Laterality Date    CARPAL TUNNEL RELEASE  03/15/2018    Neuroplasty Decompression Median Nerve At Carpal Tunnel    COLONOSCOPY  07/01/2013    Complete Colonoscopy    KNEE ARTHROSCOPY W/ DEBRIDEMENT  01/17/2014    Knee Arthroscopy (Therapeutic)    ROTATOR CUFF REPAIR  01/17/2014    Rotator Cuff Repair    US GUIDED PERCUTANEOUS PLACEMENT  12/7/2022    US GUIDED PERCUTANEOUS PLACEMENT [facility]   [4]   Family History  Problem Relation Name Age of Onset    Lung disease Mother      Cancer Father     [5]   Current Outpatient Medications   Medication Sig Dispense Refill    ibuprofen (AdviL) 200 mg tablet       ibuprofen 600 mg tablet Take 1 tablet (600 mg) by mouth every 6 hours if needed (pain). (Patient not taking: Reported on 5/22/2025)      loratadine (CLARITIN ORAL) Take by mouth. (Patient not taking: Reported on 5/22/2025)      polyethylene glycol-electrolytes (polyethylene glycol) 420 gram solution Drink two liters starting at 5 PM and additional two liters starting at midnight as directed. (Patient not taking: Reported on 5/22/2025)       No current facility-administered medications for this visit.      PRE-OP DIAGNOSIS:  Arthritis of left knee 22-Jan-2024 13:01:48  Tunde Johnson

## 2025-05-22 NOTE — LETTER
May 22, 2025     Jay Paige MD  Office Address Unavailable  As Of 03/09/2024    Patient: Rubén Lo   YOB: 1960   Date of Visit: 5/22/2025       Dear Dr. Jay Paige MD:    Thank you for referring Rubén Lo to me for evaluation. Below are my notes for this consultation.  If you have questions, please do not hesitate to call me. I look forward to following your patient along with you.       Sincerely,     Manuel Khoury, DO      CC: No Recipients  ______________________________________________________________________________________    PRIMARY CARE PHYSICIAN: Jay Paige MD  REFERRING PROVIDER: No referring provider defined for this encounter.     CONSULT ORTHOPAEDIC: Hip Evaluation        ASSESSMENT & PLAN    IMPRESSION:  Primary osteoarthritis, right hip    PLAN:  Discussed with patient his wife's diagnosis above.  Reviewed his current x-rays.  He does have severe arthritis of right hip that is affecting his quality of life.  Patient is an active lifestyle and significant commitments throughout the spring and summer and is reluctant on proceeding with surgical intervention at this time.  He would like to continue conservative management with oral anti-inflammatories and think about his treatment options while he wants to proceed sooner versus later in the fall or winter.  Will hold off on an injection as he is understanding that if he does get an injection would have to wait 3 months prior to proceeding with surgical intervention we will continue to try activity modification, home exercises, anti-inflammatory medication.      SUBJECTIVE  CHIEF COMPLAINT:   Chief Complaint   Patient presents with   • Right Hip - Pain        HPI: Rubén Lo is a 64 y.o. patient. Rubén Lo has had progressive problems with the right hip over the past 3 months. Overall been bad for 6+ months. They do not report any history of trauma to the area(s). They do report constant numbness and/or  tingling in their right foot. Their symptoms that are interfering with their daily life include lateral sided hip and groin pain. Worse with with hip flexion. XR done 5/1/2025. Sees Dr Arreguin for his back and may need a low back fusion at some point.    FUNCTIONAL STATUS: occasionally limited.  AMBULATORY STATUS: Independent  PREVIOUS TREATMENTS: NSAIDS Advil with mild improvement, still taking  Analgesics Tylenol still taking with mild improvement  HISTORY OF SURGERY ON AFFECTED HIP(S): No   BACK PAIN REPORTED: Yes,, Hx of a slipped discs      REVIEW OF SYSTEMS  Constitutional: See HPI for pain assessment, No significant weight loss, recent trauma  Cardiovascular: No chest pain, shortness of breath  Respiratory: No difficulty breathing, cough  Gastrointestinal: No nausea, vomiting, diarrhea, constipation  Musculoskeletal: Noted in HPI, positive for pain, restricted motion, stiffness  Integumentary: No rashes, easy bruising, redness   Neurological: no numbness or tingling in extremities, no gait disturbances   Psychiatric: No mood changes, memory changes, social issues  Heme/Lymph: no excessive swelling, easy bruising, excessive bleeding  ENT: No hearing changes  Eyes: No vision changes    Medical History[1]     Allergies[2]     Surgical History[3]     Family History[4]     Social History     Socioeconomic History   • Marital status:      Spouse name: Not on file   • Number of children: Not on file   • Years of education: Not on file   • Highest education level: Not on file   Occupational History   • Not on file   Tobacco Use   • Smoking status: Never   • Smokeless tobacco: Never   Substance and Sexual Activity   • Alcohol use: Not Currently   • Drug use: Defer   • Sexual activity: Defer   Other Topics Concern   • Not on file   Social History Narrative   • Not on file     Social Drivers of Health     Financial Resource Strain: Not on file   Food Insecurity: Not on file   Transportation Needs: Not on file  "  Physical Activity: Not on file   Stress: Not on file   Social Connections: Not on file   Intimate Partner Violence: Not on file   Housing Stability: Not on file        CURRENT MEDICATIONS:   Current Medications[5]     OBJECTIVE    PHYSICAL EXAM      6/17/2021     1:33 PM 6/17/2021     1:55 PM 7/6/2022    12:00 AM 12/22/2022     2:48 PM 1/25/2023    12:00 AM 5/22/2025     1:58 PM   Vitals   Systolic 130 124 108 122 122    Diastolic 90 82 70 76 70    Heart Rate 85  76 80     Temp 36.1 °C (97 °F)   36.9 °C (98.4 °F)     Resp 16  20  16    Height 1.803 m (5' 11\")   1.803 m (5' 11\") 1.803 m (5' 11\") 1.765 m (5' 9.49\")   Weight (lb) 216   210 212 218   BMI 30.13 kg/m2   29.29 kg/m2 29.57 kg/m2 31.74 kg/m2   BSA (m2) 2.22 m2   2.18 m2 2.2 m2 2.2 m2   Visit Report      Report      Body mass index is 31.74 kg/m².    GENERAL: A/Ox3, NAD. Appears healthy, well nourished  PSYCHIATRIC: Mood stable, appropriate memory recall  EYES: EOM intact, no scleral icterus  CARDIAC: regular rate  LUNGS: Breathing non-labored  SKIN: no erythema, rashes, or ecchymoses     MUSCULOSKELETAL:  Laterality: right Hip Exam  - ROM, Extension: full, no flexion contracture  - Strength: Abduction 5/5 against resitance, Flexion 5/5  - Palpation: mild TTP along greater trochanter  - Log roll/IR exam: painful and limited motion. Pain with hip flexion past 90 degrees and internal rotation  - Straight leg raise: negative  - EHL/PF/DF motor intact  - Gait: antalgic to arthritic side, negative Trendelenburg gait   - Special Tests: none performed    NEUROVASCULAR:  - Neurovascular Status: sensation intact to light touch distally  - Capillary refill brisk at extremities, Bilateral dorsalis pedis pulse 2+       IMAGING:  Multiple views of the affected right hip(s) demonstrate: severe osteoarthritis of the right hip with complete joint space narrowing, subchondral sclerosis, marginal severe change and underlying cam deformity.   X-rays were personally reviewed " and interpreted by me.  Radiology reports were reviewed by me as well, if readily available at the time.    ** Voice Recognition software was used to dictate this note. Please be aware that minor errors in the transcription may be present **    Manuel Khoury DO  Attending Surgeon  Joint Replacement and Adult Reconstructive Surgery  [place], [place]         [1]  Past Medical History:  Diagnosis Date   • Elevated prostate specific antigen (PSA)     Elevated prostate specific antigen (PSA)   • Other conditions influencing health status 12/11/2013    Meatal stenosis   • Personal history of other diseases of the respiratory system 01/17/2014    History of acute bronchitis   • Spondylosis without myelopathy or radiculopathy, lumbar region 12/11/2013    Lumbar spondylosis   • Unspecified asthma, uncomplicated ([facility]) 01/17/2014    Asthmatic bronchitis   • Unspecified osteoarthritis, unspecified site 12/13/2013    Osteoarthritis   [2]  Allergies  Allergen Reactions   • Amoxicillin-Pot Clavulanate Nausea Only   [3]  Past Surgical History:  Procedure Laterality Date   • CARPAL TUNNEL RELEASE  03/15/2018    Neuroplasty Decompression Median Nerve At Carpal Tunnel   • COLONOSCOPY  07/01/2013    Complete Colonoscopy   • KNEE ARTHROSCOPY W/ DEBRIDEMENT  01/17/2014    Knee Arthroscopy (Therapeutic)   • ROTATOR CUFF REPAIR  01/17/2014    Rotator Cuff Repair   • US GUIDED PERCUTANEOUS PLACEMENT  12/7/2022    US GUIDED PERCUTANEOUS PLACEMENT [facility]   [4]  Family History  Problem Relation Name Age of Onset   • Lung disease Mother     • Cancer Father     [5]  Current Outpatient Medications   Medication Sig Dispense Refill   • ibuprofen (AdviL) 200 mg tablet      • ibuprofen 600 mg tablet Take 1 tablet (600 mg) by mouth every 6 hours if needed (pain). (Patient not taking: Reported on 5/22/2025)     • loratadine (CLARITIN ORAL) Take by mouth. (Patient not taking: Reported on 5/22/2025)     •  polyethylene glycol-electrolytes (polyethylene glycol) 420 gram solution Drink two liters starting at 5 PM and additional two liters starting at midnight as directed. (Patient not taking: Reported on 5/22/2025)       No current facility-administered medications for this visit.

## 2025-07-28 ENCOUNTER — TELEPHONE (OUTPATIENT)
Dept: ORTHOPEDIC SURGERY | Facility: CLINIC | Age: 65
End: 2025-07-28
Payer: COMMERCIAL

## 2025-07-28 DIAGNOSIS — M16.11 PRIMARY OSTEOARTHRITIS OF RIGHT HIP: ICD-10-CM

## 2025-07-28 RX ORDER — DICLOFENAC SODIUM 75 MG/1
75 TABLET, DELAYED RELEASE ORAL 2 TIMES DAILY PRN
Qty: 30 TABLET | Refills: 0 | Status: SHIPPED | OUTPATIENT
Start: 2025-07-28

## 2025-07-28 NOTE — TELEPHONE ENCOUNTER
Patient would like a refill on the Diclofenac  75mg. He has only been taking them here and there for hip pain. He only has 9 left and would like a refill. He is scheduled for a pre-op appointment, just needs something to get him through till he can have the surgery.  [facility] in [place] is his pharmacy

## 2025-08-29 ENCOUNTER — TELEPHONE (OUTPATIENT)
Facility: HOSPITAL | Age: 65
End: 2025-08-29
Payer: COMMERCIAL

## 2025-08-30 DIAGNOSIS — M16.11 PRIMARY OSTEOARTHRITIS OF RIGHT HIP: ICD-10-CM

## 2025-09-01 RX ORDER — DICLOFENAC SODIUM 75 MG/1
TABLET, DELAYED RELEASE ORAL
Qty: 30 TABLET | Refills: 0 | Status: SHIPPED | OUTPATIENT
Start: 2025-09-01

## 2025-09-02 ENCOUNTER — TELEPHONE (OUTPATIENT)
Dept: ORTHOPEDIC SURGERY | Facility: HOSPITAL | Age: 65
End: 2025-09-02
Payer: COMMERCIAL

## 2025-09-11 ENCOUNTER — APPOINTMENT (OUTPATIENT)
Dept: ORTHOPEDIC SURGERY | Facility: HOSPITAL | Age: 65
End: 2025-09-11
Payer: COMMERCIAL